# Patient Record
Sex: MALE | Race: WHITE | NOT HISPANIC OR LATINO
[De-identification: names, ages, dates, MRNs, and addresses within clinical notes are randomized per-mention and may not be internally consistent; named-entity substitution may affect disease eponyms.]

---

## 2019-04-29 PROBLEM — Z00.00 ENCOUNTER FOR PREVENTIVE HEALTH EXAMINATION: Status: ACTIVE | Noted: 2019-04-29

## 2019-04-30 ENCOUNTER — APPOINTMENT (OUTPATIENT)
Dept: UROLOGY | Facility: CLINIC | Age: 64
End: 2019-04-30
Payer: COMMERCIAL

## 2019-04-30 VITALS
HEIGHT: 70 IN | SYSTOLIC BLOOD PRESSURE: 142 MMHG | WEIGHT: 229 LBS | DIASTOLIC BLOOD PRESSURE: 89 MMHG | HEART RATE: 68 BPM | BODY MASS INDEX: 32.78 KG/M2

## 2019-04-30 PROCEDURE — 99213 OFFICE O/P EST LOW 20 MIN: CPT

## 2019-04-30 NOTE — ASSESSMENT
[FreeTextEntry1] : This is a followup visit for this 63-year-old patient was seen about 10 days ago with flank pain\par At that time patient had a CAT scan showing a 3-5 mm stone in his right kidney\par He is here today saying he is voiding visible blood and urine\par His voided urine is brown\par I have set him up for CT abdomen and pelvis without contrast\par It is quite clear that the stone in the kidney is not causing his pain\par the patient denies dysuria or change in his voiding pattern\par

## 2019-04-30 NOTE — PHYSICAL EXAM
[Prostate Hard Area Or Nodule Bilaterally] : had no palpable nodules [Prostate Tenderness] : was not tender [Rectal Exam - Seminal Vesicles] : was normal [Nl Sphincter Tone] : normal sphincter tone [Rectal Tenderness] : no tenderness [Mass___cm] : no rectal masses [Nl Perineum] : perineum was normal on inspection [Discharge] : no discharge [No Lesions] : no lesions [Lesions] : without lesions [Normal] : a normal meatus [Mass ___cm] : no mass [Testes Tenderness Left] : non-tender [Testes Tenderness Right] : non-tender [Epididymis] : was normal [___] : there was no mass on the right testicle

## 2019-05-01 ENCOUNTER — TRANSCRIPTION ENCOUNTER (OUTPATIENT)
Age: 64
End: 2019-05-01

## 2019-05-08 ENCOUNTER — MESSAGE (OUTPATIENT)
Age: 64
End: 2019-05-08

## 2019-05-29 ENCOUNTER — APPOINTMENT (OUTPATIENT)
Dept: UROLOGY | Facility: CLINIC | Age: 64
End: 2019-05-29
Payer: COMMERCIAL

## 2019-05-29 VITALS
SYSTOLIC BLOOD PRESSURE: 138 MMHG | HEIGHT: 70 IN | WEIGHT: 224 LBS | BODY MASS INDEX: 32.07 KG/M2 | DIASTOLIC BLOOD PRESSURE: 86 MMHG | HEART RATE: 82 BPM

## 2019-05-29 DIAGNOSIS — Q64.31 CONGENITAL BLADDER NECK OBSTRUCTION: ICD-10-CM

## 2019-05-29 PROCEDURE — 99024 POSTOP FOLLOW-UP VISIT: CPT

## 2019-05-29 NOTE — PHYSICAL EXAM
[General Appearance - Well Nourished] : well nourished [General Appearance - Well Developed] : well developed [Normal Appearance] : normal appearance [Well Groomed] : well groomed [Abdomen Soft] : soft [General Appearance - In No Acute Distress] : no acute distress [Costovertebral Angle Tenderness] : no ~M costovertebral angle tenderness [Abdomen Tenderness] : non-tender [Urethral Meatus] : meatus normal [Urinary Bladder Findings] : the bladder was normal on palpation [No Prostate Nodules] : no prostate nodules [Testes Mass (___cm)] : there were no testicular masses [Scrotum] : the scrotum was normal [FreeTextEntry1] : Ureter was tight and was dilated to 40 [place] without [Edema] : no peripheral edema [] : no respiratory distress [Exaggerated Use Of Accessory Muscles For Inspiration] : no accessory muscle use [Oriented To Time, Place, And Person] : oriented to person, place, and time [Respiration, Rhythm And Depth] : normal respiratory rhythm and effort [Affect] : the affect was normal [Mood] : the mood was normal [Not Anxious] : not anxious [No Focal Deficits] : no focal deficits [Normal Station and Gait] : the gait and station were normal for the patient's age [No Palpable Adenopathy] : no palpable adenopathy

## 2019-05-29 NOTE — REVIEW OF SYSTEMS
Date & Time: 9/24/2018, 6:17 AM  Patient: Margaret Silveira  Encounter Provider(s):    Lynsey Aviles MD       To Whom It May Concern:    Margaret Silveira was seen and treated in our department on 9/24/2018. He should not return to work until 9/25/18.
[Negative] : Heme/Lymph
[Negative] : Heme/Lymph

## 2019-05-29 NOTE — ASSESSMENT
[FreeTextEntry1] : This is a postoperative visit for this 63-year-old patient who underwent ESWL on the right ureteral stone and was done well with no pain he's had a single episode of hematuria without burning or frequency\par After limits too early to do a follow up x-ray to make sure that the stone the ureters, and because he is interested in doing an ESWL on the left kidney\par I will bring him back in August for followup

## 2019-05-29 NOTE — PHYSICAL EXAM
[General Appearance - Well Nourished] : well nourished [Normal Appearance] : normal appearance [General Appearance - Well Developed] : well developed [Well Groomed] : well groomed [General Appearance - In No Acute Distress] : no acute distress [Abdomen Soft] : soft [Abdomen Tenderness] : non-tender [Costovertebral Angle Tenderness] : no ~M costovertebral angle tenderness [Urethral Meatus] : meatus normal [Urinary Bladder Findings] : the bladder was normal on palpation [No Prostate Nodules] : no prostate nodules [Scrotum] : the scrotum was normal [Testes Mass (___cm)] : there were no testicular masses [FreeTextEntry1] : Ureter was tight and was dilated to 40 [place] without [Edema] : no peripheral edema [] : no respiratory distress [Respiration, Rhythm And Depth] : normal respiratory rhythm and effort [Exaggerated Use Of Accessory Muscles For Inspiration] : no accessory muscle use [Oriented To Time, Place, And Person] : oriented to person, place, and time [Affect] : the affect was normal [Mood] : the mood was normal [Not Anxious] : not anxious [No Focal Deficits] : no focal deficits [Normal Station and Gait] : the gait and station were normal for the patient's age [No Palpable Adenopathy] : no palpable adenopathy

## 2019-07-03 ENCOUNTER — RX RENEWAL (OUTPATIENT)
Age: 64
End: 2019-07-03

## 2019-08-01 ENCOUNTER — APPOINTMENT (OUTPATIENT)
Dept: UROLOGY | Facility: CLINIC | Age: 64
End: 2019-08-01

## 2019-08-01 ENCOUNTER — MESSAGE (OUTPATIENT)
Age: 64
End: 2019-08-01

## 2019-08-01 DIAGNOSIS — J45.909 UNSPECIFIED ASTHMA, UNCOMPLICATED: ICD-10-CM

## 2019-08-01 DIAGNOSIS — I10 ESSENTIAL (PRIMARY) HYPERTENSION: ICD-10-CM

## 2019-08-01 DIAGNOSIS — E78.5 HYPERLIPIDEMIA, UNSPECIFIED: ICD-10-CM

## 2019-08-01 DIAGNOSIS — M19.90 UNSPECIFIED OSTEOARTHRITIS, UNSPECIFIED SITE: ICD-10-CM

## 2019-11-08 PROBLEM — M19.90 ARTHRITIS: Status: ACTIVE | Noted: 2019-11-08

## 2019-11-08 PROBLEM — I10 HYPERTENSION: Status: ACTIVE | Noted: 2019-11-08

## 2019-11-08 PROBLEM — E78.5 HYPERLIPIDEMIA: Status: ACTIVE | Noted: 2019-11-08

## 2019-11-08 PROBLEM — J45.909 ASTHMA: Status: ACTIVE | Noted: 2019-11-08

## 2019-11-20 ENCOUNTER — APPOINTMENT (OUTPATIENT)
Dept: NEUROLOGY | Facility: CLINIC | Age: 64
End: 2019-11-20
Payer: COMMERCIAL

## 2019-11-20 VITALS
HEIGHT: 70 IN | WEIGHT: 224 LBS | HEART RATE: 69 BPM | SYSTOLIC BLOOD PRESSURE: 146 MMHG | DIASTOLIC BLOOD PRESSURE: 91 MMHG | BODY MASS INDEX: 32.07 KG/M2

## 2019-11-20 DIAGNOSIS — G56.03 CARPAL TUNNEL SYNDROM,BILATERAL UPPER LIMBS: ICD-10-CM

## 2019-11-20 PROCEDURE — 99244 OFF/OP CNSLTJ NEW/EST MOD 40: CPT

## 2019-11-21 ENCOUNTER — APPOINTMENT (OUTPATIENT)
Dept: UROLOGY | Facility: CLINIC | Age: 64
End: 2019-11-21
Payer: COMMERCIAL

## 2019-11-21 ENCOUNTER — OTHER (OUTPATIENT)
Age: 64
End: 2019-11-21

## 2019-11-21 PROCEDURE — 50590 FRAGMENTING OF KIDNEY STONE: CPT

## 2019-11-22 NOTE — CONSULT LETTER
[Dear  ___] : Dear  [unfilled], [FreeTextEntry1] : I had the pleasure of evaluating your patient, MEÑO BEYER. Please see the assessment section below for a summary of my diagnostic impression and plan.\par \par Thank you very much for allowing me to participate in the care of this patient. If you have any questions, please do not hesitate to contact me. \par \par Sincerely,\par \par Maggie Jansen MD\par

## 2019-11-22 NOTE — ASSESSMENT
[FreeTextEntry1] : Mr. Garzon is a 64 year old man with paresthesias in both hands.\par The differential diagnosis includes carpal tunnel syndrome, cervical radiculopathy. \par MRI cervical spine\par EMG nerve conduction studies of the arms.\par

## 2019-11-22 NOTE — HISTORY OF PRESENT ILLNESS
[FreeTextEntry1] : Mr. Garzon is a 64 year old man with many years bilateral hand numbness at night. He has had a worsening of this symptom and currently the numbness is constant in his left hand involving the first 3 digits. He also has the same symptoms in his right hand but not as prominent. There are paresthesias but no lack of feeling.  He has neck pain. A few years ago he had neck pain radiating down the left arm which was relieved with an epidural steroid injection. He has no weakness, no bowel or bladder changes, no gait difficulty.  He has had several years of bilateral foot pain. It is worse when he stands. It involves the heel and the ball of the foot but not the arches.  There are no pins and needles, no numbness, no weakness, no neurological type pain.\par He has intermittent low back pain without any radiating pain.\par He was diagnosed with diabetes mellitus in November 2018.\par He had an MRI of the cervical spine in the past and he was told that he had stenosis. \par He had an EMG in the past but does not know what the results were.\par H/O migraine - none in many years.  Were relieved with Imitrex in the past.

## 2019-11-22 NOTE — PHYSICAL EXAM
[FreeTextEntry1] : Physical examination \par General: No acute distress, Awake, Alert.   \par Fundus: disc margins sharp.   \par \par Unable\par \par Neck: no Carotid bruit.   \par Cardiovascular: Normal rate, Regular rhythm, No murmur.  \par \par Mental status \par Awake, alert, and oriented to person, time and place, Normal attention span and concentration, Recent and remote memory intact, Language intact, Fund of knowledge intact.   \par \par Cranial Nerves \par II: VFF  \par III, IV, VI: PERRL, EOMI.   \par V: Facial sensation is normal B/L.   \par VII: Facial strength is normal B/L. \par VIII: Gross hearing is intact.   \par IX, X: Palate is midline and elevates symmetrically.   \par XI: Trapezius normal strength.   \par XII: Tongue midline without atrophy or fasciculations. \par \par Motor exam  \par Muscle tone - no evidence of rigidity or resistance in all 4 extremities.  \par No atrophy or fasciculations \par Muscle Strength: arms and legs, proximal and distal flexors and extensors are normal \par \par No UE drift.\par \par Reflexes \par Diffuse hyporeflexia\par Plantars right: mute.   \par Plantars left: mute.   \par \par Coordination \par Finger to nose: Normal.  \par Heel to shin: Normal.   \par \par \par Sensory \par Intact sensation to vibration, proprioception, PP and cold.\par \par \par Gait \par Normal including heels, toes, and tandem gait.  \par \par \par No Tinel's sign in the wrist or elbows. Positive Phalen's sign, bilaterally.\par \par \par

## 2020-01-15 ENCOUNTER — APPOINTMENT (OUTPATIENT)
Dept: NEUROLOGY | Facility: CLINIC | Age: 65
End: 2020-01-15
Payer: COMMERCIAL

## 2020-01-15 DIAGNOSIS — R20.2 PARESTHESIA OF SKIN: ICD-10-CM

## 2020-01-15 DIAGNOSIS — M47.812 SPONDYLOSIS W/OUT MYELOPATHY OR RADICULOPATHY, CERVICAL REGION: ICD-10-CM

## 2020-01-15 DIAGNOSIS — M54.12 RADICULOPATHY, CERVICAL REGION: ICD-10-CM

## 2020-01-15 PROCEDURE — 95913 NRV CNDJ TEST 13/> STUDIES: CPT

## 2020-01-15 PROCEDURE — 95886 MUSC TEST DONE W/N TEST COMP: CPT

## 2020-01-20 PROBLEM — R20.2 PARESTHESIA: Status: ACTIVE | Noted: 2020-01-20

## 2021-02-08 ENCOUNTER — APPOINTMENT (OUTPATIENT)
Dept: NEUROLOGY | Facility: CLINIC | Age: 66
End: 2021-02-08
Payer: COMMERCIAL

## 2021-02-08 VITALS
SYSTOLIC BLOOD PRESSURE: 130 MMHG | WEIGHT: 230 LBS | HEIGHT: 70 IN | TEMPERATURE: 97.2 F | DIASTOLIC BLOOD PRESSURE: 86 MMHG | BODY MASS INDEX: 32.93 KG/M2

## 2021-02-08 PROCEDURE — 99072 ADDL SUPL MATRL&STAF TM PHE: CPT

## 2021-02-08 PROCEDURE — 99215 OFFICE O/P EST HI 40 MIN: CPT

## 2021-02-10 NOTE — CONSULT LETTER
[Dear  ___] : Dear  [unfilled], [Consult Letter:] : I had the pleasure of evaluating your patient, [unfilled]. [Consult Closing:] : Thank you very much for allowing me to participate in the care of this patient.  If you have any questions, please do not hesitate to contact me. [Sincerely,] : Sincerely, [FreeTextEntry3] : Maggie Jansen M.D.

## 2021-02-10 NOTE — ASSESSMENT
[FreeTextEntry1] : Mr. Garzon is a 64 yo man with longstanding history of migraine.\par Continue previously effective abortive therapy with tylenol/Advil or Frova depending on the severity.\par Start preventive treatment:\par I would recommend starting with changing one of his anti-HTN medications to either propranolol or verapamil.\par \par Sleep medicine referral for evaluation for obstructive sleep apnea which can be contributing to headaches. \par \par Need results of MRI.\par \par I discussed in detail with the patient the diagnosis, prognosis, treatment plan and answered all of their questions.\par \par D/W PMD who agrees.

## 2021-02-10 NOTE — HISTORY OF PRESENT ILLNESS
[FreeTextEntry1] : Mr. Garzon is here for management of headaches.\par Temporal and orbital pain - like his eyes are going to explode.  \par Can last all day.\par Started decades ago.\par Severity - average is a 5/10; at its worst it is 8-9/10.\par No nausea.\par Photophobia, phonophobia.\par Relieved with tylenol and advil.\par THe more severe ones are relieved with Frova which he has been taking for many years as needed. \par \par He wakes up unrefreshed and sometimes with a headache. \par \par He grinds his teeth but cannot tolerate wearing a mouth guard.\par \par He has no hand numbness now.\par \par He saw an ophthalmologist and ENT for the headaches but no cause was found. He had an MRI brain and/or sinuses done and was told this was normal.\par \par \par A few months ago he had vertigo when he started to use a virtual reality headset that he received as a gift. \par \par FHx: his daughter has migraine

## 2021-02-10 NOTE — ADDENDUM
[FreeTextEntry1] : MRI brain w & w/o - 1/15/21 - minimal chronic WM ischemic changes, otherwise normal.

## 2021-02-10 NOTE — PHYSICAL EXAM
[FreeTextEntry1] : Physical examination \par General: No acute distress, Awake, Alert.   \par \par Mental status \par Awake, alert, appropriate. \par \par Cranial Nerves \par II: VFF  \par III, IV, VI: PERRL, EOMI.   \par V: Facial sensation is normal B/L.   \par VII: Facial strength is normal B/L. \par VIII: Gross hearing is intact.   \par IX, X: Palate is midline and elevates symmetrically.   \par XI: Trapezius normal strength.   \par XII: Tongue midline without atrophy or fasciculations. \par \par Motor exam  \par Muscle tone - no evidence of rigidity or resistance in all 4 extremities.  \par No atrophy or fasciculations \par Muscle Strength: arms and legs, proximal and distal flexors and extensors are normal \par No UE drift.\par \par Reflexes \par All present, normal, and symmetrical.   \par \par Plantars right: mute.   \par Plantars left: mute.   \par \par \par Coordination \par Finger to nose: Normal.  \par Heel to shin: Normal.   \par \par \par Sensory \par Intact sensation to vibration and cold.\par \par \par \par Gait \par Normal including heels, toes, and tandem gait.  \par \par \par

## 2021-04-09 ENCOUNTER — APPOINTMENT (OUTPATIENT)
Dept: NEUROLOGY | Facility: CLINIC | Age: 66
End: 2021-04-09
Payer: COMMERCIAL

## 2021-04-09 VITALS
HEIGHT: 70 IN | TEMPERATURE: 97.2 F | HEART RATE: 73 BPM | BODY MASS INDEX: 33.64 KG/M2 | WEIGHT: 235 LBS | SYSTOLIC BLOOD PRESSURE: 144 MMHG | DIASTOLIC BLOOD PRESSURE: 89 MMHG

## 2021-04-09 DIAGNOSIS — G43.909 MIGRAINE, UNSPECIFIED, NOT INTRACTABLE, W/OUT STATUS MIGRAINOSUS: ICD-10-CM

## 2021-04-09 DIAGNOSIS — G43.819 OTHER MIGRAINE, INTRACTABLE, W/OUT STATUS MIGRAINOSUS: ICD-10-CM

## 2021-04-09 PROCEDURE — 99072 ADDL SUPL MATRL&STAF TM PHE: CPT

## 2021-04-09 PROCEDURE — 99213 OFFICE O/P EST LOW 20 MIN: CPT

## 2021-04-09 RX ORDER — DICLOFENAC SODIUM 1 %
1 KIT TOPICAL
Refills: 0 | Status: ACTIVE | COMMUNITY

## 2021-04-09 RX ORDER — LOSARTAN POTASSIUM 100 MG/1
100 TABLET, FILM COATED ORAL
Refills: 0 | Status: ACTIVE | COMMUNITY

## 2021-04-09 RX ORDER — METFORMIN HYDROCHLORIDE 500 MG/1
500 TABLET, COATED ORAL
Refills: 0 | Status: ACTIVE | COMMUNITY

## 2021-04-09 RX ORDER — ROSUVASTATIN CALCIUM 5 MG/1
5 TABLET, FILM COATED ORAL
Refills: 0 | Status: ACTIVE | COMMUNITY

## 2021-04-09 RX ORDER — MULTIVIT-MIN/FOLIC/VIT K/LYCOP 400-300MCG
25 MCG TABLET ORAL
Refills: 0 | Status: ACTIVE | COMMUNITY

## 2021-04-09 NOTE — HISTORY OF PRESENT ILLNESS
[FreeTextEntry1] : Curt Garzon is a 65 year old man with a history of hypertension presenting for a follow up appointment for headaches. \par \par He is currently having eight morning headaches per month. He endorses he tried Verapamil in March while taking his losartan and it may have helped slightly but caused dizziness. He stopped Verapamil and the dizziness improved. He is unsure if his headaches improved while on the Verapamil. Mr. Garzon endorses that the Frova does help his migraines. He describes the headaches as centered around the eyes. He reports waking up in a "fog" and plans on seeing the sleep specialist. He reports having intense dreams. \par \par The remaining neurological review of systems is negative. \par \par 2/8/21\par Mr. Garzon is here for management of headaches.\par Temporal and orbital pain - like his eyes are going to explode.  \par Can last all day.\par Started decades ago.\par Severity - average is a 5/10; at its worst it is 8-9/10.\par No nausea.\par Photophobia, phonophobia.\par Relieved with tylenol and advil.\par THe more severe ones are relieved with Frova which he has been taking for many years as needed. \par \par He wakes up unrefreshed and sometimes with a headache. \par \par He grinds his teeth but cannot tolerate wearing a mouth guard.\par \par He has no hand numbness now.\par \par He saw an ophthalmologist and ENT for the headaches but no cause was found. He had an MRI brain and/or sinuses done and was told this was normal.\par \par \par A few months ago he had vertigo when he started to use a virtual reality headset that he received as a gift. \par \par FHx: his daughter has migraine

## 2021-04-09 NOTE — DATA REVIEWED
[de-identified] : MRI brain w & w/o - 1/15/21 - minimal chronic WM ischemic changes, otherwise normal.

## 2021-04-19 ENCOUNTER — APPOINTMENT (OUTPATIENT)
Dept: PULMONOLOGY | Facility: HOSPITAL | Age: 66
End: 2021-04-19
Payer: COMMERCIAL

## 2021-04-19 VITALS — BODY MASS INDEX: 32.93 KG/M2 | WEIGHT: 230 LBS | HEIGHT: 70 IN

## 2021-04-19 DIAGNOSIS — R06.83 SNORING: ICD-10-CM

## 2021-04-19 PROCEDURE — 99244 OFF/OP CNSLTJ NEW/EST MOD 40: CPT | Mod: GT

## 2021-04-19 RX ORDER — NORTRIPTYLINE HYDROCHLORIDE 25 MG/1
25 CAPSULE ORAL
Qty: 30 | Refills: 5 | Status: DISCONTINUED | COMMUNITY
Start: 2021-04-09 | End: 2021-04-19

## 2021-04-19 NOTE — HISTORY OF PRESENT ILLNESS
MEDICATION:   tiZANidine (ZANAFLEX) 4 MG tablet 180 tablet 0 12/10/2018     Sig: TAKE 1 TABLET BY MOUTH EVERY 6 HOURS AS NEEDED      DX: Muscle spasms of lower extremity [M62.838]    Patient's insurance: [place]/ [facility]  Patient ID# [id number]/ [id number]    Received notice from [facility] on [facility] [place] in [place] that Tizanidine 4 mg tablet requires prior authorization.  Ordering provider: Delmar Carrera MD    Submitted authorization request to [facility] via CMM online.  Per online submission: pending.       [FreeTextEntry1] : Dr. Suárez\par 65 year old man  with history of htn, dm, hld, headaches  is here in the sleep center to rule out sleep apnea.  Patient is sleepy with Lawrence sleepiness score of 12.  Patient has loud snoring which disturbs his wife, does not have any witnessed apneas.  Patient's bedtime is around 9.30 PM wakes up in the morning around 6 AM. He dreams all night.  He feels tired when he wakes up and wakes up with a fog.  Patient drinks 1-2 cups of coffee during the daytime. Patient does not have nocturia.  He is not sleepy while driving.\par Patient has frequent headaches about 4-6 a month.\par He doesn’t want to use CPAP for treatment.\par STOPBANG score -4\par

## 2021-04-19 NOTE — REASON FOR VISIT
[Home] : at home, [unfilled] , at the time of the visit. [Medical Office: ([facility])___] : at the medical office located in  [Verbal consent obtained from patient] : the patient, [unfilled] [Initial Evaluation] : an initial evaluation [FreeTextEntry1] : sleep apnea

## 2021-07-06 RX ORDER — KETOROLAC TROMETHAMINE 10 MG/1
10 TABLET, FILM COATED ORAL
Qty: 20 | Refills: 0 | Status: ACTIVE | COMMUNITY
Start: 2021-07-06 | End: 1900-01-01

## 2021-07-08 ENCOUNTER — APPOINTMENT (OUTPATIENT)
Dept: NEUROLOGY | Facility: CLINIC | Age: 66
End: 2021-07-08

## 2021-07-13 ENCOUNTER — APPOINTMENT (OUTPATIENT)
Dept: UROLOGY | Facility: CLINIC | Age: 66
End: 2021-07-13
Payer: COMMERCIAL

## 2021-07-13 VITALS
SYSTOLIC BLOOD PRESSURE: 142 MMHG | BODY MASS INDEX: 32.93 KG/M2 | DIASTOLIC BLOOD PRESSURE: 90 MMHG | TEMPERATURE: 98.2 F | WEIGHT: 230 LBS | HEIGHT: 70 IN | HEART RATE: 82 BPM

## 2021-07-13 DIAGNOSIS — Z92.29 PERSONAL HISTORY OF OTHER DRUG THERAPY: ICD-10-CM

## 2021-07-13 PROCEDURE — 99214 OFFICE O/P EST MOD 30 MIN: CPT

## 2021-07-13 PROCEDURE — 99072 ADDL SUPL MATRL&STAF TM PHE: CPT

## 2021-07-13 RX ORDER — KETOROLAC TROMETHAMINE 10 MG/1
10 TABLET, FILM COATED ORAL
Qty: 20 | Refills: 0 | Status: DISCONTINUED | COMMUNITY
Start: 2019-07-03 | End: 2021-07-13

## 2021-07-13 NOTE — ADDENDUM
[FreeTextEntry1] : PVR\par A transabdominal ultrasound was performed image of the bladder were obtained in the transverse and longitudinal modes post void residuals unmeasurable no stones were seen

## 2021-07-13 NOTE — ASSESSMENT
[FreeTextEntry1] : This is a followup visit for this 65-year-old patient with a long history of nephrolithiasis whose had multiple procedures and presents with an episode of renal colic\par Review of his most recent CAT scan shows 2 small stones in the lower pole left kidney no stones in the right kidney and a 5 mm stone in the distal left ureter\par \par i had a discussion of his options which are to do nothing which were spontaneous passagePerform lithotripsy\par Consider ureteroscopy laser lithotripsy stone extraction and stent\par he is going away later this month and wishes to schedule an ESWL\par If possible we'll accommodate his wishes

## 2021-07-15 ENCOUNTER — APPOINTMENT (OUTPATIENT)
Dept: UROLOGY | Facility: CLINIC | Age: 66
End: 2021-07-15
Payer: COMMERCIAL

## 2021-07-15 DIAGNOSIS — N23 UNSPECIFIED RENAL COLIC: ICD-10-CM

## 2021-07-15 PROCEDURE — 51798 US URINE CAPACITY MEASURE: CPT

## 2021-07-15 PROCEDURE — 99213 OFFICE O/P EST LOW 20 MIN: CPT

## 2021-07-15 RX ORDER — ACETAMINOPHEN AND CODEINE PHOSPHATE 300; 30 MG/1; MG/1
300-30 TABLET ORAL 4 TIMES DAILY
Qty: 20 | Refills: 0 | Status: ACTIVE | COMMUNITY
Start: 2021-07-15 | End: 1900-01-01

## 2021-07-15 NOTE — ADDENDUM
[FreeTextEntry1] : PVR\par A transabdominal ultrasound was performed and images of the bladder with pain in the transverse and longitudinal\par Postvoid residual was negligible

## 2021-07-15 NOTE — ASSESSMENT
[FreeTextEntry1] : Patient is scheduled for an ESWL in several days but now has gross hematuria and pain\par Bladder ultrasound shows good emptying of the bladder despite the fact that he has severe frequency\par This is very suggestive that the stone may be in the juxtaductal position \par I will request a  KUB to see if I can identify the stone\par  if that's the case I will change scheduled procedure  for ureteroscopy

## 2021-07-19 ENCOUNTER — APPOINTMENT (OUTPATIENT)
Dept: UROLOGY | Facility: HOSPITAL | Age: 66
End: 2021-07-19
Payer: COMMERCIAL

## 2021-07-19 PROCEDURE — 52356 CYSTO/URETERO W/LITHOTRIPSY: CPT | Mod: LT

## 2021-07-20 ENCOUNTER — NON-APPOINTMENT (OUTPATIENT)
Age: 66
End: 2021-07-20

## 2021-07-22 ENCOUNTER — APPOINTMENT (OUTPATIENT)
Dept: UROLOGY | Facility: CLINIC | Age: 66
End: 2021-07-22
Payer: MEDICARE

## 2021-07-22 VITALS
BODY MASS INDEX: 32.93 KG/M2 | TEMPERATURE: 98 F | HEIGHT: 70 IN | DIASTOLIC BLOOD PRESSURE: 91 MMHG | HEART RATE: 90 BPM | SYSTOLIC BLOOD PRESSURE: 148 MMHG | WEIGHT: 230 LBS

## 2021-07-22 PROCEDURE — 99072 ADDL SUPL MATRL&STAF TM PHE: CPT

## 2021-07-22 PROCEDURE — 52310 CYSTOSCOPY AND TREATMENT: CPT

## 2021-07-22 RX ORDER — AMOXICILLIN AND CLAVULANATE POTASSIUM 500; 125 MG/1; MG/1
500-125 TABLET, FILM COATED ORAL
Qty: 3 | Refills: 0 | Status: ACTIVE | COMMUNITY
Start: 2021-07-22 | End: 1900-01-01

## 2021-07-22 NOTE — ASSESSMENT
[FreeTextEntry1] : The patient underwent S. left ureteroscopy stone and laser lithotripsy stone removal and stent earlier this week and she today had the stent removed\par The day after his procedure he tore tendon of his kneecap and is wearing a brace\par He is about to have another surgery\par Cultures urine put him on Augmentin to make her that he didn't get a urinary tract infection to complicate his upcoming surgery

## 2021-07-23 LAB
APPEARANCE: ABNORMAL
BACTERIA: ABNORMAL
BILIRUBIN URINE: NEGATIVE
BLOOD URINE: ABNORMAL
COLOR: ABNORMAL
GLUCOSE QUALITATIVE U: NEGATIVE
HYALINE CASTS: 0 /LPF
KETONES URINE: NORMAL
LEUKOCYTE ESTERASE URINE: ABNORMAL
MICROSCOPIC-UA: NORMAL
NITRITE URINE: NEGATIVE
PH URINE: 6
PROTEIN URINE: ABNORMAL
RED BLOOD CELLS URINE: >720 /HPF
SPECIFIC GRAVITY URINE: 1.03
SQUAMOUS EPITHELIAL CELLS: 2 /HPF
URINE COMMENTS: NORMAL
UROBILINOGEN URINE: NORMAL
WHITE BLOOD CELLS URINE: 20 /HPF

## 2021-08-02 PROBLEM — N23 RENAL COLIC ON LEFT SIDE: Status: ACTIVE | Noted: 2021-07-13

## 2021-08-25 ENCOUNTER — TRANSCRIPTION ENCOUNTER (OUTPATIENT)
Age: 66
End: 2021-08-25

## 2021-12-19 ENCOUNTER — APPOINTMENT (OUTPATIENT)
Dept: AFTER HOURS CARE | Facility: EMERGENCY ROOM | Age: 66
End: 2021-12-19
Payer: COMMERCIAL

## 2021-12-19 DIAGNOSIS — Z78.9 OTHER SPECIFIED HEALTH STATUS: ICD-10-CM

## 2021-12-19 DIAGNOSIS — H92.02 OTALGIA, LEFT EAR: ICD-10-CM

## 2021-12-19 PROCEDURE — 99212 OFFICE O/P EST SF 10 MIN: CPT | Mod: 95

## 2021-12-19 RX ORDER — CIPROFLOXACIN AND DEXAMETHASONE 3; 1 MG/ML; MG/ML
0.3-0.1 SUSPENSION/ DROPS AURICULAR (OTIC) TWICE DAILY
Qty: 1 | Refills: 0 | Status: ACTIVE | COMMUNITY
Start: 2021-12-19 | End: 1900-01-01

## 2021-12-20 PROBLEM — Z78.9 SOCIAL ALCOHOL USE: Status: ACTIVE | Noted: 2021-12-20

## 2021-12-20 PROBLEM — H92.02 LEFT EAR PAIN: Status: ACTIVE | Noted: 2021-12-19

## 2021-12-20 NOTE — PHYSICAL EXAM
[No Acute Distress] : no acute distress [Well Nourished] : well nourished [EOMI] : extraocular movements intact [Normal Outer Ear/Nose] : the outer ears and nose were normal in appearance [Supple] : supple [No Respiratory Distress] : no respiratory distress  [No Accessory Muscle Use] : no accessory muscle use [Normal Insight/Judgement] : insight and judgment were intact [de-identified] : there is no visible discahrge or bleeding from the left ear on limited exam, when patient asked to tap over the mastoid process or retract on the auricle did not illict pain or discomfort  [de-identified] : moves upper ext  [de-identified] : no rash on the  face or external ears [de-identified] : alert and orinted with clear speech

## 2021-12-20 NOTE — PLAN
[With new medications prescribed] : Treat in place: with new medications prescribed [FreeTextEntry1] : 67 yo M with left ear pain likely otitis externa vs. otitis media unlikely mastoidites will treat with ear drops and advised patient if symptoms are not improving then to follow up sooner

## 2021-12-20 NOTE — REVIEW OF SYSTEMS
[Earache] : earache [Postnasal Drip] : postnasal drip [Nasal Discharge] : nasal discharge [Fever] : no fever [Discharge] : no discharge [Pain] : no pain [Hearing Loss] : no hearing loss [Nosebleeds] : no nosebleeds [Sore Throat] : no sore throat [Hoarseness] : no hoarseness [Chest Pain] : no chest pain [Shortness Of Breath] : no shortness of breath [Cough] : no cough [Abdominal Pain] : no abdominal pain [Nausea] : no nausea [Dysuria] : no dysuria [Joint Pain] : no joint pain [Back Pain] : no back pain [Itching] : no itching [Skin Rash] : no skin rash [Headache] : no headache [Dizziness] : no dizziness

## 2021-12-20 NOTE — HISTORY OF PRESENT ILLNESS
[Home] : at home, [unfilled] , at the time of the visit. [Other Location: e.g. Home (Enter Location, City,State)___] : at [unfilled] [Spouse] : spouse [Verbal consent obtained from patient] : the patient, [unfilled] [FreeTextEntry8] : 67 yo M with a traumatic left ear pain with mild discharge. patient recently returned from trip from the [facility] and then developed upper respiratory symptoms then developed the left ear ache no fever or chills or change in hearing reported \par patient tested negative for covid x 2

## 2022-02-16 NOTE — PHYSICAL EXAM
[FreeTextEntry1] : \par Exam on 2/8/21 \par Physical examination \par General: No acute distress, Awake, Alert.   \par \par Mental status \par Awake, alert, appropriate. \par \par Cranial Nerves \par II: VFF  \par III, IV, VI: PERRL, EOMI.   \par V: Facial sensation is normal B/L.   \par VII: Facial strength is normal B/L. \par VIII: Gross hearing is intact.   \par IX, X: Palate is midline and elevates symmetrically.   \par XI: Trapezius normal strength.   \par XII: Tongue midline without atrophy or fasciculations. \par \par Motor exam  \par Muscle tone - no evidence of rigidity or resistance in all 4 extremities.  \par No atrophy or fasciculations \par Muscle Strength: arms and legs, proximal and distal flexors and extensors are normal \par No UE drift.\par \par Reflexes \par All present, normal, and symmetrical.   \par \par Plantars right: mute.   \par Plantars left: mute.   \par \par \par Coordination \par Finger to nose: Normal.  \par Heel to shin: Normal.   \par \par \par Sensory \par Intact sensation to vibration and cold.\par \par \par \par Gait \par Normal including heels, toes, and tandem gait.  \par \par \par 
antalgic gait/decreased helene/decreased step length/decreased stride length/decreased weight-shifting ability

## 2023-10-01 PROBLEM — G56.03 CARPAL TUNNEL SYNDROME, BILATERAL UPPER LIMBS: Status: ACTIVE | Noted: 2019-11-20

## 2024-04-26 ENCOUNTER — APPOINTMENT (OUTPATIENT)
Dept: UROLOGY | Facility: CLINIC | Age: 69
End: 2024-04-26
Payer: MEDICARE

## 2024-04-26 VITALS
BODY MASS INDEX: 32.93 KG/M2 | WEIGHT: 230 LBS | HEIGHT: 70 IN | SYSTOLIC BLOOD PRESSURE: 148 MMHG | DIASTOLIC BLOOD PRESSURE: 88 MMHG | HEART RATE: 78 BPM

## 2024-04-26 DIAGNOSIS — N20.0 CALCULUS OF KIDNEY: ICD-10-CM

## 2024-04-26 DIAGNOSIS — R31.0 GROSS HEMATURIA: ICD-10-CM

## 2024-04-26 DIAGNOSIS — R10.9 UNSPECIFIED ABDOMINAL PAIN: ICD-10-CM

## 2024-04-26 DIAGNOSIS — Z12.5 ENCOUNTER FOR SCREENING FOR MALIGNANT NEOPLASM OF PROSTATE: ICD-10-CM

## 2024-04-26 PROCEDURE — 76705 ECHO EXAM OF ABDOMEN: CPT

## 2024-04-26 PROCEDURE — 99204 OFFICE O/P NEW MOD 45 MIN: CPT

## 2024-04-29 LAB — PSA SERPL-MCNC: 0.21 NG/ML

## 2024-04-29 NOTE — ASSESSMENT
[FreeTextEntry1] : Patient is a 68-year-old male who presents to me for evaluation of left kidney stones, gross hematuria and prostate cancer screening.  He but has intermittent left flank pain most recently several months ago where he felt like he may have passed a kidney stone.  He denies any associated fevers but did have gross hematuria.  He has a long history of recurrent kidney stones managed by Dr. Newman in the past.  His digital rectal exam today is normal and his PSAs have always been low.  His last imaging with KUB shows a 1 cm left renal pelvis stone and about a 7 mm left lower pole stone.  Assessment and plan 1.  Gross hematuria 2.  Left renal calculi 3.  Prostate cancer screening I performed a renal ultrasound in the office that showed no obvious hydronephrosis or mass.  My plan is to repeat his KUB and PSA and I will call him with the results and the plan.

## 2024-04-30 ENCOUNTER — RESULT REVIEW (OUTPATIENT)
Age: 69
End: 2024-04-30

## 2024-11-25 ENCOUNTER — TRANSCRIPTION ENCOUNTER (OUTPATIENT)
Age: 69
End: 2024-11-25